# Patient Record
Sex: FEMALE
[De-identification: names, ages, dates, MRNs, and addresses within clinical notes are randomized per-mention and may not be internally consistent; named-entity substitution may affect disease eponyms.]

---

## 2019-03-19 ENCOUNTER — HOSPITAL ENCOUNTER (OUTPATIENT)
Dept: HOSPITAL 42 - PET-BROA | Age: 74
End: 2019-03-19
Payer: MEDICARE

## 2019-03-19 DIAGNOSIS — C18.9: Primary | ICD-10-CM

## 2019-03-29 ENCOUNTER — HOSPITAL ENCOUNTER (OUTPATIENT)
Dept: HOSPITAL 42 - SDS | Age: 74
LOS: 1 days | Discharge: HOME | End: 2019-03-30
Attending: RADIOLOGY
Payer: MEDICARE

## 2019-03-29 VITALS — BODY MASS INDEX: 34.4 KG/M2

## 2019-03-29 DIAGNOSIS — Z88.0: ICD-10-CM

## 2019-03-29 DIAGNOSIS — I10: ICD-10-CM

## 2019-03-29 DIAGNOSIS — Z93.3: ICD-10-CM

## 2019-03-29 DIAGNOSIS — C79.89: Primary | ICD-10-CM

## 2019-03-29 DIAGNOSIS — Z90.710: ICD-10-CM

## 2019-03-29 DIAGNOSIS — Z85.048: ICD-10-CM

## 2019-03-29 DIAGNOSIS — R09.02: ICD-10-CM

## 2019-03-29 DIAGNOSIS — F41.9: ICD-10-CM

## 2019-03-29 LAB
APTT BLD: 33.2 SECONDS (ref 26.9–38.3)
BASOPHILS # BLD AUTO: 0.01 K/MM3 (ref 0–2)
BASOPHILS NFR BLD: 0.1 % (ref 0–3)
BUN SERPL-MCNC: 17 MG/DL (ref 7–21)
CALCIUM SERPL-MCNC: 9.8 MG/DL (ref 8.4–10.5)
EOSINOPHIL # BLD: 0.2 10*3/UL (ref 0–0.7)
EOSINOPHIL NFR BLD: 3.3 % (ref 1.5–5)
ERYTHROCYTE [DISTWIDTH] IN BLOOD BY AUTOMATED COUNT: 13.8 % (ref 11.5–14.5)
GFR NON-AFRICAN AMERICAN: > 60
HGB BLD-MCNC: 13.7 G/DL (ref 12–16)
INR PPP: 1.11
LYMPHOCYTES # BLD: 1.1 10*3/UL (ref 1.2–3.4)
LYMPHOCYTES NFR BLD AUTO: 16.8 % (ref 22–35)
MCH RBC QN AUTO: 29.7 PG (ref 25–35)
MCHC RBC AUTO-ENTMCNC: 32.7 G/DL (ref 31–37)
MCV RBC AUTO: 90.7 FL (ref 80–105)
MONOCYTES # BLD AUTO: 0.5 10*3/UL (ref 0.1–0.6)
MONOCYTES NFR BLD: 6.9 % (ref 1–6)
PLATELET # BLD: 282 10^3/UL (ref 120–450)
PMV BLD AUTO: 10.1 FL (ref 7–11)
PROTHROMBIN TIME: 12.5 SECONDS (ref 9.4–12.5)
RBC # BLD AUTO: 4.62 10^6/UL (ref 3.5–6.1)
WBC # BLD AUTO: 6.7 10^3/UL (ref 4.5–11)

## 2019-03-29 NOTE — CT
PROCEDURE:  CT guided pelvic biopsy.



HISTORY:

Colon carcinoma.  2 cm mass in the pelvis near the anastomosis.  

Positive PET.  Evaluate for recurrence. 



PHYSICIAN(S):  Ap Kay MD.



TECHNIQUE:

The relative risks and indications of the procedure were explained to 

the patient and consent obtained. The patient was placed prone on the 

CT scanner and preliminary images through the pelvis obtained. 

Conscious sedation and monitoring were provided throughout the 

procedure by a nurse.



There is a triangular 2 cm opacity in the left pelvis corresponding 

to the abnormal mass seen on PET scan..  A left posterior trans 

gluteal approach was selected and the area prepped and draped in the 

usual sterile fashion. 1% Xylocaine was used to anesthetize the skin 

and soft tissues. A 17-gauge guiding needle was advanced into the 2 

cm triangular mass in the left pelvis..  Its position was confirmed 

with CT. Using coaxial technique, multiple core biopsies were 

obtained. The postprocedure images show no evidence of significant 

hemorrhage.



IMPRESSION:

1. CT-guided pelvic biopsy as described above.

## 2019-03-30 VITALS — HEART RATE: 69 BPM

## 2019-03-30 VITALS — TEMPERATURE: 98.1 F | DIASTOLIC BLOOD PRESSURE: 64 MMHG | OXYGEN SATURATION: 97 % | SYSTOLIC BLOOD PRESSURE: 132 MMHG

## 2019-03-30 VITALS — RESPIRATION RATE: 20 BRPM

## 2019-04-05 ENCOUNTER — HOSPITAL ENCOUNTER (OUTPATIENT)
Dept: HOSPITAL 42 - PAT | Age: 74
End: 2019-04-05
Payer: MEDICARE

## 2019-04-09 ENCOUNTER — HOSPITAL ENCOUNTER (INPATIENT)
Dept: HOSPITAL 42 - SDS | Age: 74
LOS: 4 days | Discharge: NURSING, REHAB OR LONG-TERM CARE | DRG: 331 | End: 2019-04-13
Payer: MEDICARE

## 2019-04-09 VITALS — BODY MASS INDEX: 34.4 KG/M2

## 2019-04-09 DIAGNOSIS — Z53.20: ICD-10-CM

## 2019-04-09 DIAGNOSIS — K43.2: ICD-10-CM

## 2019-04-09 DIAGNOSIS — C20: Primary | ICD-10-CM

## 2019-04-09 DIAGNOSIS — Z93.3: ICD-10-CM

## 2019-04-09 DIAGNOSIS — Z85.048: ICD-10-CM

## 2019-04-09 DIAGNOSIS — K66.0: ICD-10-CM

## 2019-04-09 DIAGNOSIS — Z92.3: ICD-10-CM

## 2019-04-09 DIAGNOSIS — I10: ICD-10-CM

## 2019-04-09 DIAGNOSIS — K43.5: ICD-10-CM

## 2019-04-09 DIAGNOSIS — Z88.0: ICD-10-CM

## 2019-04-09 PROCEDURE — 0DT80ZZ RESECTION OF SMALL INTESTINE, OPEN APPROACH: ICD-10-PCS

## 2019-04-09 PROCEDURE — 05HM33Z INSERTION OF INFUSION DEVICE INTO RIGHT INTERNAL JUGULAR VEIN, PERCUTANEOUS APPROACH: ICD-10-PCS

## 2019-04-09 PROCEDURE — 0WQF0ZZ REPAIR ABDOMINAL WALL, OPEN APPROACH: ICD-10-PCS

## 2019-04-09 PROCEDURE — 0JH60WZ INSERTION OF TOTALLY IMPLANTABLE VASCULAR ACCESS DEVICE INTO CHEST SUBCUTANEOUS TISSUE AND FASCIA, OPEN APPROACH: ICD-10-PCS

## 2019-04-09 NOTE — RAD
Date of service: 



04/09/2019



HISTORY:

 RIJ portacath placement, in PACU 



COMPARISON:

04/05/2019 



TECHNIQUE:

1 view obtained.



FINDINGS:



LUNGS:

Right IJ Port-A-Cath terminates at the junction of the SVC and right 

atrium.  There is no pneumothorax.  Nasogastric tube is in 

satisfactory position



PLEURA:

No significant pleural effusion identified, no pneumothorax apparent.



CARDIOVASCULAR:

No aortic atherosclerotic calcification present.



Mild cardiomegaly no pulmonary vascular congestion. 



OSSEOUS STRUCTURES:

No significant abnormalities.



VISUALIZED UPPER ABDOMEN:

Normal.



OTHER FINDINGS:

None.



IMPRESSION:

Right IJ Port-A-Cath terminates at the junction of the SVC and right 

atrium.  There is no pneumothorax.  Nasogastric tube is in 

satisfactory position

## 2019-04-09 NOTE — RAD
Date of service: 



04/09/2019



PROCEDURE:  Fluoroscopy up to 1 hr



HISTORY:

N



COMPARISON:





TECHNIQUE:

4.1 sec of fluoro time.  Cumulative dose 0.67 mGy.  2 images were 

submitted



FINDINGS:

The tip of the Port-A-Cath is seen in the right atrium.



IMPRESSION:

As above

## 2019-04-09 NOTE — PCM.SURG1
Surgeon's Initial Post Op Note





- Surgeon's Notes


Surgeon: Otoniel Patel MD


Assistant: Zuleima Fairchild, PGY-4; Ilda Bhatia, PGY-2


Type of Anesthesia: General Endo


Anesthesia Administered By: Dr. Wells


Pre-Operative Diagnosis: Pelvic mass, ventral hernias x 2, need for chemotherapy


Operative Findings: Adhesions, small bowel enterotomy, see op report


Post-Operative Diagnosis: Pelvic mass, ventral hernias x 2, need for 

chemotherapy


Operation Performed: Exploratory laparotomy, primary ventral hernia repair x 2, 

primary parastomal hernia repair, small bowel resection with side to side 

anastomosis, RIJ portacath insertion


Specimen/Specimens Removed: Small bowel, hernia sacs


Estimated Blood Loss: EBL {In ML}: 250


Blood Products Given: N/A


Drains Used: Wai


Post-Op Condition: Fair


Date of Surgery/Procedure: 04/09/19


Time of Surgery/Procedure: 14:17

## 2019-04-10 LAB
BASOPHILS # BLD AUTO: 0 K/MM3 (ref 0–2)
BASOPHILS NFR BLD: 0 % (ref 0–3)
BUN SERPL-MCNC: 12 MG/DL (ref 7–21)
CALCIUM SERPL-MCNC: 8.2 MG/DL (ref 8.4–10.5)
EOSINOPHIL # BLD: 0 10*3/UL (ref 0–0.7)
EOSINOPHIL NFR BLD: 0 % (ref 1.5–5)
ERYTHROCYTE [DISTWIDTH] IN BLOOD BY AUTOMATED COUNT: 13.4 % (ref 11.5–14.5)
GFR NON-AFRICAN AMERICAN: > 60
HGB BLD-MCNC: 11.8 G/DL (ref 12–16)
LYMPHOCYTES # BLD: 0.9 10*3/UL (ref 1.2–3.4)
LYMPHOCYTES NFR BLD AUTO: 7.2 % (ref 22–35)
MCH RBC QN AUTO: 28.5 PG (ref 25–35)
MCHC RBC AUTO-ENTMCNC: 31.8 G/DL (ref 31–37)
MCV RBC AUTO: 89.6 FL (ref 80–105)
MONOCYTES # BLD AUTO: 1 10*3/UL (ref 0.1–0.6)
MONOCYTES NFR BLD: 7.5 % (ref 1–6)
PLATELET # BLD: 300 10^3/UL (ref 120–450)
PMV BLD AUTO: 10.1 FL (ref 7–11)
RBC # BLD AUTO: 4.14 10^6/UL (ref 3.5–6.1)
WBC # BLD AUTO: 12.6 10^3/UL (ref 4.5–11)

## 2019-04-10 PROCEDURE — 0D9670Z DRAINAGE OF STOMACH WITH DRAINAGE DEVICE, VIA NATURAL OR ARTIFICIAL OPENING: ICD-10-PCS

## 2019-04-10 RX ADMIN — POTASSIUM CHLORIDE SCH MLS/HR: 2 INJECTION, SOLUTION, CONCENTRATE INTRAVENOUS at 14:34

## 2019-04-10 RX ADMIN — POTASSIUM CHLORIDE SCH MLS/HR: 2 INJECTION, SOLUTION, CONCENTRATE INTRAVENOUS at 21:43

## 2019-04-10 RX ADMIN — ENOXAPARIN SODIUM SCH MG: 40 INJECTION SUBCUTANEOUS at 14:15

## 2019-04-10 NOTE — CP.PCM.PCO
Physician Communication Note





- Physician Communication Note


Physician Communication Note: SOLITARIO Carlson:Rx-PPI/Hold Lovenox/Ice chips/Hold PO

KCl

## 2019-04-10 NOTE — CP.PCM.PCO
Additional Comments





- Additional Comments


Additional Comments: 





POD #1 operative report viewed, NGT, LON, greenberg intact, repeat labs in am, 

surgery following, PT eval pending

## 2019-04-10 NOTE — OP
PROCEDURE DATE:  04/09/2019



SURGEON:  Otoniel Patel MD



FIRST ASSISTANT:  Zuleima Fairchild DO, PGY-3



SECOND ASSISTANT:  Ilda Bhatia DO, PGY-2



ANESTHETIST:  Dr. Priscilla sewell.



ANESTHESIA:  General endotracheal - Q ball (On-Q).



PREOPERATIVE DIAGNOSES:

1.  Ventral hernia - paracolostomy hernia.

2.  Recurrent rectal carcinoma.



POSTOPERATIVE DIAGNOSES:

1.  Ventral hernia - paracolostomy hernia.

2.  Recurrent rectal carcinoma.

3.  Second incisional ventral hernia (total two) and paracolostomy hernia.

4.  Extensive adhesions.

5.  Recurrent rectal cancer.



PROCEDURES:  04/09/2019:

1.  Exploratory laparotomy with lysis of adhesions.

2.  Small bowel resection with anastomosis.

3.  Repair of pericolostomy hernia.

4.  Ventral herniorrhaphy x2.

5.  Insertion of a right internal jugular single-lumen Port-A-Cath.



OPERATIVE INDICATION:  The patient is a 73-year-old  female who is

3 years post abdominal perineal resection of the rectal carcinoma that had

been preoperatively excised and treated with radiation.  Over this period

of time, she has been followed with PET - CT scanning demonstrating

activity in the rectal space without finding any palpable lesion and a new

lesion in the lower pelvis on the left side that is biopsied by Dr. Ap Kay with CT scan confirming adenocarcinoma consistent with recurrent

rectal cancer.  There are no other findings in the PET scanning and the

patient has been advised that the oncologist would like to have the lesion

removed, so chemotherapy can be initiated.  At the time of the procedure,

the patient will have a Port-A-Cath placed to facilitate the chemotherapy

and the pericolostomy hernia and incisional hernia that have become quite

large will be repaired at the same time.  Risks, benefits and the

alternatives with their anticipated outcomes were discussed with the

patient and she signs the informed consent.



DESCRIPTION OF PROCEDURE:  The patient was brought to the operating room,

identified by her wristband, undergoes time-out procedure, is placed on the

table in a supine manner, undergoes the induction of general anesthesia and

insertion of an endotracheal tube.  Sequential compression devices were

placed on the lower extremities.  The abdomen was electrically clipped,

prepped with Hibiclens, chlorhexidine preparation of the patient was

aseptically draped.  The colostomy is closed with a running locking 2-0

polyester suture to prevent spill and the incision was closed with

iodine-impregnated VI-Drape.



Midline incision was made from the pubis up all the way towards the xiphoid

and sharp dissection carried down through to the hernia sac found below. 

Hemostasis was obtained with cautery.  The hernia sac is dissected free and

the peritoneum was entered between clamps and adhesions were encountered

and were sharply lysed throughout.  A second hernia sac is found in the

upper end of the incision and this was then exposed with extension of the

incision and excision of the hernia sac.  Both hernia sacs submitted to

pathology in formalin.



The abdomen is now completely explored and safe for adhesions.  No gross

tumor was found anywhere throughout the multiple explorations including

running of the small bowel more than 4 or 5 times.  The pelvis contained

multiple loops of small bowel in the region where the PET scanning lit up

and with meticulous dissection, this was completely removed and gross

examination of the presacral space failed to reveal any further malignancy.

The mesentery is now somewhat more hidden by the dissection and it is

elected at this point to excise this portion of the small bowel which was

stuck in the pelvis near the region of the positive PET scan and a

side-to-side anastomosis was performed with the FATOUMATA stapling devices.  The

mesentery was closed with running 2-0 chromic catgut suture.



Attention was now drawn to the paracolostomy hernia which was closed with

interrupted #1 Prolene sutures from the inside of the abdomen closing the

defect significantly and allowing one fingerbreadth alongside the

descending colon.



Attention was now drawn to the midline which was confirmed clear off any

further hernias and closed with interrupted figure-of-eight Smead-Howell

retention-type sutures with #1 Prolene sutures.  A Wai drain was placed

into the peritoneal cavity from the right side, placed down in the pelvis

and secured with 2-0 Surgidac polyester suture.  Once the subcutaneous

space was completely closed and hemostasis confirmed, the wound was lavaged

with saline and closed with 3-0 Polysorb continuous suture and the skin

with the AutoSuture skin stapler.



An On-Q ball is utilized with placement at the time of the incisional

closure in the preperitoneal space and further infiltration with Exparel on

the fascia and skin.



The right internal jugular Port-A-Cath was placed under direct fluoroscopic

vision and is secured with 3-0 Polysorb subcuticular closure and Dermabond

adhesive.  The Port-A-Cath was accessed at this point, flushed with

heparinized saline and portable x-ray will be taken postoperatively.



The patient is now awakened, extubated, transported to the recovery room in

a satisfactory condition.  Sponge, instrument and suture count were

verified as correct at the end of the procedure.  Estimated blood loss

during the procedure was 250 mL of blood.



Surgical assistants were present throughout and were extremely essential in

the dissection and in the performance of this procedure.



This dictation will be electronically signed without being read.







__________________________________________

Otoniel Patel MD





DD:  04/10/2019 8:18:07

DT:  04/10/2019 8:22:06

Job # [id number]

## 2019-04-10 NOTE — CP.PCM.PN
<Ilda Bhatia - Last Filed: 04/10/19 14:04>





Subjective





- Date & Time of Evaluation


Date of Evaluation: 04/10/19


Time of Evaluation: 09:20





- Subjective


Subjective: 





General surgery progress note for Dr. Patel-Ilda Bhatia, PGY-2





Pt seen/examined at bedside with surgical team





Pt reports pain well controlled overnight without requiring break through IV 

pain medications.   Reports abdominal pain with coughings and movement. States 

that she had some nausea and vomited last night- she attributes it to the pain 

medications.  Pt reports feeling weak due to lack of PO intake for the past 5 

days.  No output to ostomy yet. NGT with 250 dark gastric contents out/12s. 

Wai with 70cc serosanguinous output over 24hrs. UOP 150cc/12 hrs. 





Objective





- Vital Signs/Intake and Output


Vital Signs (last 24 hours): 


                                        











Temp Pulse Resp BP Pulse Ox


 


 99.9 F H  88   20   123/64   96 


 


 04/10/19 06:00  04/10/19 06:00  04/10/19 06:00  04/10/19 06:00  04/10/19 06:00








Intake and Output: 


                                        











 04/10/19 04/10/19





 06:59 18:59


 


Intake Total 1500 


 


Output Total 390 


 


Balance 1110 














- Medications


Medications: 


                               Current Medications





Enoxaparin Sodium (Lovenox)  40 mg SC DAILY [facility]; Protocol


Hydromorphone HCl (Dilaudid)  0.25 mg IVP Q3H PRN


   PRN Reason: Pain, severe (8-10)


Potassium Chloride 20 meq/ (Dextrose/Sodium Chloride)  1,010 mls @ 125 mls/hr IV

.Q8H5M [facility]


Metoprolol Tartrate (Lopressor)  2.5 mg IVP Q6 PRN


   PRN Reason: Hypertension


Ondansetron HCl (Zofran Inj)  4 mg IVP Q6 PRN


   PRN Reason: Nausea/Vomiting


   Last Admin: 04/09/19 23:25 Dose:  4 mg


Ondansetron HCl (Zofran Inj)  4 mg IVP ONCE PRN


   PRN Reason: Nausea/Vomiting











- Labs


Labs: 


                                        





                                 04/10/19 06:35 





                                 04/10/19 06:35 











- Constitutional


Appears: Non-toxic, No Acute Distress





- Head Exam


Head Exam: ATRAUMATIC, NORMAL INSPECTION, NORMOCEPHALIC





- Eye Exam


Eye Exam: EOMI, Normal appearance





- ENT Exam


ENT Exam: Mucous Membranes Moist, Normal Exam


Additional comments: 





NGT in place in nares





- Respiratory Exam


Respiratory Exam: NORMAL BREATHING PATTERN





- Cardiovascular Exam


Cardiovascular Exam: REGULAR RHYTHM, +S1, +S2





- GI/Abdominal Exam


GI & Abdominal Exam: Soft, Tenderness (over surgical incision sites).  absent: 

Distended (obese), Firm, Hernia


Additional comments: 





Midline incision with dressing in place- clean/dry/intact, Ostomy with no output


Wai in place with ~10 cc serosanguious output to ball





-  Exam


Additional comments: 





Greenberg cather in place with  150cc light yellow urine output





- Extremities Exam


Extremities Exam: Normal Inspection





- Neurological Exam


Neurological Exam: Alert, Awake, CN II-XII Intact, Oriented x3





- Psychiatric Exam


Psychiatric exam: Normal Affect, Normal Mood





- Skin


Skin Exam: Dry, Intact, Normal Color, Warm





Assessment and Plan





- Assessment and Plan (Free Text)


Assessment: 





73F POD#1 s/p laparotomy with primary ventral hernia repairs x 2, primary 

parastomal hernia repair x 1, small bowel resection x1


Plan: 





Continue NGT


Ok for ice chips


IVF


Continue Pain control- OnQ ball with local and prn breakthrough IV pain med


Anti-emetic PRN


Monitor ostomy for output


Monitor Wai output


Monitor NGT output


Maintain abdominal binder


Encourage IS use


OOBTC


PT


Maintain greenberg until urine output adequate


Strict I/Os


Initiate DVT ppx - Lovenox


SCDs





DW Dr Jorge Bhatia, PGY-





<Otoniel Patel - Last Filed: 04/11/19 10:31>





Objective





- Vital Signs/Intake and Output


Vital Signs (last 24 hours): 


                                        











Temp Pulse Resp BP Pulse Ox


 


 98.4 F   101 H  20   154/72 H  95 


 


 04/11/19 06:00  04/11/19 06:00  04/11/19 06:00  04/11/19 06:00  04/11/19 06:00








Intake and Output: 


                                        











 04/11/19 04/11/19





 06:59 18:59


 


Intake Total 2750 


 


Output Total 1095 


 


Balance 1655 














- Medications


Medications: 


                               Current Medications





Acetaminophen (Tylenol 325mg Tab)  650 mg PO Q6H PRN


   PRN Reason: Pain, Mild (1-3)


Enoxaparin Sodium (Lovenox)  40 mg SC DAILY [facility]; Protocol


   Last Admin: 04/10/19 14:15 Dose:  40 mg


Hydralazine HCl (Apresoline)  10 mg PO Q6 PRN


   PRN Reason: SBP>150 


Hydromorphone HCl (Dilaudid)  0.25 mg IVP Q3H PRN


   PRN Reason: Pain, severe (8-10)


   Last Admin: 04/11/19 01:06 Dose:  0.25 mg


Potassium Chloride 20 meq/ (Dextrose/Sodium Chloride)  1,010 mls @ 125 mls/hr IV

.Q8H5M [facility]


   Last Admin: 04/11/19 05:57 Dose:  125 mls/hr


Ondansetron HCl (Zofran Inj)  4 mg IVP Q6 PRN


   PRN Reason: Nausea/Vomiting


   Last Admin: 04/11/19 01:06 Dose:  4 mg


Pantoprazole Sodium (Protonix Inj)  40 mg IVP DAILY [facility]











- Labs


Labs: 


                                        





                                 04/11/19 07:00 





                                 04/11/19 07:00 











Assessment and Plan





- Assessment and Plan (Free Text)


Plan: 





PO1 250 cc ngt  drainage


Eddi Hold lovenox/No po KCl


\


R Jorge ASHLEY FACS

## 2019-04-11 LAB
BASOPHILS # BLD AUTO: 0.01 K/MM3 (ref 0–2)
BASOPHILS NFR BLD: 0.1 % (ref 0–3)
BUN SERPL-MCNC: 7 MG/DL (ref 7–21)
CALCIUM SERPL-MCNC: 8.3 MG/DL (ref 8.4–10.5)
EOSINOPHIL # BLD: 0 10*3/UL (ref 0–0.7)
EOSINOPHIL NFR BLD: 0.1 % (ref 1.5–5)
ERYTHROCYTE [DISTWIDTH] IN BLOOD BY AUTOMATED COUNT: 14 % (ref 11.5–14.5)
GFR NON-AFRICAN AMERICAN: > 60
HGB BLD-MCNC: 11 G/DL (ref 12–16)
LYMPHOCYTES # BLD: 0.9 10*3/UL (ref 1.2–3.4)
LYMPHOCYTES NFR BLD AUTO: 7.2 % (ref 22–35)
MCH RBC QN AUTO: 29.7 PG (ref 25–35)
MCHC RBC AUTO-ENTMCNC: 32.3 G/DL (ref 31–37)
MCV RBC AUTO: 92.2 FL (ref 80–105)
MONOCYTES # BLD AUTO: 0.9 10*3/UL (ref 0.1–0.6)
MONOCYTES NFR BLD: 7 % (ref 1–6)
PLATELET # BLD: 243 10^3/UL (ref 120–450)
PMV BLD AUTO: 10.2 FL (ref 7–11)
RBC # BLD AUTO: 3.7 10^6/UL (ref 3.5–6.1)
WBC # BLD AUTO: 12.4 10^3/UL (ref 4.5–11)

## 2019-04-11 RX ADMIN — HYDROMORPHONE HYDROCHLORIDE PRN MG: 1 INJECTION, SOLUTION INTRAMUSCULAR; INTRAVENOUS; SUBCUTANEOUS at 11:35

## 2019-04-11 RX ADMIN — POTASSIUM CHLORIDE SCH MLS/HR: 2 INJECTION, SOLUTION, CONCENTRATE INTRAVENOUS at 05:57

## 2019-04-11 RX ADMIN — POTASSIUM CHLORIDE SCH MLS/HR: 2 INJECTION, SOLUTION, CONCENTRATE INTRAVENOUS at 15:02

## 2019-04-11 RX ADMIN — HYDROMORPHONE HYDROCHLORIDE PRN MG: 1 INJECTION, SOLUTION INTRAMUSCULAR; INTRAVENOUS; SUBCUTANEOUS at 01:06

## 2019-04-11 RX ADMIN — POTASSIUM CHLORIDE SCH MLS/HR: 2 INJECTION, SOLUTION, CONCENTRATE INTRAVENOUS at 23:15

## 2019-04-11 NOTE — CP.PCM.PN
Subjective





- Date & Time of Evaluation


Date of Evaluation: 04/11/19


Time of Evaluation: 07:00





- Subjective


Subjective: 


GENERAL SURGERY PROGRESS NOTE FOR DR. NAGEL





Patient seen and examined at bedside. She states that pain is well controlled 

without narcotic pain medication. Reports pain only when coughing or moving. She

refused PT yesterday stating she was too weak. No output into colostomy yet. 





Objective





- Vital Signs/Intake and Output


Vital Signs (last 24 hours): 


                                        











Temp Pulse Resp BP Pulse Ox


 


 98.4 F   101 H  20   154/72 H  95 


 


 04/11/19 06:00  04/11/19 06:00  04/11/19 06:00  04/11/19 06:00  04/11/19 06:00








Intake and Output: 


                                        











 04/11/19 04/11/19





 06:59 18:59


 


Intake Total 2750 


 


Output Total 1095 


 


Balance 1655 














- Medications


Medications: 


                               Current Medications





Acetaminophen (Tylenol 325mg Tab)  650 mg PO Q6H PRN


   PRN Reason: Pain, Mild (1-3)


Enoxaparin Sodium (Lovenox)  40 mg SC DAILY [facility]; Protocol


   Last Admin: 04/10/19 14:15 Dose:  40 mg


Hydralazine HCl (Apresoline)  10 mg PO Q6 PRN


   PRN Reason: SBP>150 


Hydromorphone HCl (Dilaudid)  0.25 mg IVP Q3H PRN


   PRN Reason: Pain, severe (8-10)


   Last Admin: 04/11/19 01:06 Dose:  0.25 mg


Potassium Chloride 20 meq/ (Dextrose/Sodium Chloride)  1,010 mls @ 125 mls/hr IV

.Q8H5M [facility]


   Last Admin: 04/11/19 05:57 Dose:  125 mls/hr


Ondansetron HCl (Zofran Inj)  4 mg IVP Q6 PRN


   PRN Reason: Nausea/Vomiting


   Last Admin: 04/11/19 01:06 Dose:  4 mg


Pantoprazole Sodium (Protonix Inj)  40 mg IVP DAILY [facility]











- Labs


Labs: 


                                        





                                 04/11/19 07:00 





                                 04/11/19 07:00 











- Constitutional


Appears: Non-toxic, No Acute Distress





- Head Exam


Head Exam: ATRAUMATIC, NORMAL INSPECTION





- Eye Exam


Eye Exam: EOMI, Normal appearance





- Respiratory Exam


Respiratory Exam: NORMAL BREATHING PATTERN.  absent: Respiratory Distress





- Cardiovascular Exam


Cardiovascular Exam: +S1, +S2





- GI/Abdominal Exam


GI & Abdominal Exam: Soft, Tenderness (appropriate tenderness to incision area).

 absent: Distended, Firm, Guarding, Rigid


Additional comments: 


Wai drain in place with 25cc serosanguinous output overnight


NG tube with 100cc brown/coffee ground output overnight


Greenberg with 700cc output overnight


Abdominal binder in place


No output into colostomy yet


On-Q in place @ 3cc/hr





- Neurological Exam


Neurological Exam: Alert, Awake, Oriented x3





- Psychiatric Exam


Psychiatric exam: Normal Affect, Normal Mood





- Skin


Skin Exam: Dry, Normal Color





Assessment and Plan





- Assessment and Plan (Free Text)


Assessment: 


72yo F with ventral hernia, parastomal hernia, and pelvic mass (path = 

adenocarcinoma) now s/p Exploratory laparotomy, primary ventral hernia repair x 

2, primary parastomal hernia repair, small bowel resection with side to side 

anastomosis, RIJ portacath insertion POD#2





- Good urine output, DC greenberg


- Will continue to monitor for bowel function in colostomy bag


- Lovenox held due to output from NG tube


- Continue NG tube output 


- Protonix


- FU pathology


- CEA ordered


- Synthroid started


- Strongly encouraged IS use and OOB, ambulation


- Discussed plan with Dr. Jorge Fairchild PGY-4

## 2019-04-12 LAB
BASOPHILS # BLD AUTO: 0.01 K/MM3 (ref 0–2)
BASOPHILS NFR BLD: 0.1 % (ref 0–3)
BUN SERPL-MCNC: 4 MG/DL (ref 7–21)
CALCIUM SERPL-MCNC: 8.9 MG/DL (ref 8.4–10.5)
EOSINOPHIL # BLD: 0 10*3/UL (ref 0–0.7)
EOSINOPHIL NFR BLD: 0.2 % (ref 1.5–5)
ERYTHROCYTE [DISTWIDTH] IN BLOOD BY AUTOMATED COUNT: 13.8 % (ref 11.5–14.5)
GFR NON-AFRICAN AMERICAN: > 60
HGB BLD-MCNC: 11.2 G/DL (ref 12–16)
LYMPHOCYTES # BLD: 0.8 10*3/UL (ref 1.2–3.4)
LYMPHOCYTES NFR BLD AUTO: 6.4 % (ref 22–35)
MCH RBC QN AUTO: 28.9 PG (ref 25–35)
MCHC RBC AUTO-ENTMCNC: 31.5 G/DL (ref 31–37)
MCV RBC AUTO: 91.5 FL (ref 80–105)
MONOCYTES # BLD AUTO: 0.7 10*3/UL (ref 0.1–0.6)
MONOCYTES NFR BLD: 5.2 % (ref 1–6)
PLATELET # BLD: 279 10^3/UL (ref 120–450)
PMV BLD AUTO: 9.9 FL (ref 7–11)
RBC # BLD AUTO: 3.88 10^6/UL (ref 3.5–6.1)
T4 FREE SERPL-MCNC: 0.95 NG/DL (ref 0.78–2.19)
WBC # BLD AUTO: 12.6 10^3/UL (ref 4.5–11)

## 2019-04-12 RX ADMIN — ENOXAPARIN SODIUM SCH MG: 40 INJECTION SUBCUTANEOUS at 10:52

## 2019-04-12 RX ADMIN — HYDROMORPHONE HYDROCHLORIDE PRN MG: 1 INJECTION, SOLUTION INTRAMUSCULAR; INTRAVENOUS; SUBCUTANEOUS at 11:51

## 2019-04-12 RX ADMIN — HYDROMORPHONE HYDROCHLORIDE PRN MG: 1 INJECTION, SOLUTION INTRAMUSCULAR; INTRAVENOUS; SUBCUTANEOUS at 20:35

## 2019-04-12 RX ADMIN — POTASSIUM CHLORIDE SCH MLS/HR: 2 INJECTION, SOLUTION, CONCENTRATE INTRAVENOUS at 07:20

## 2019-04-12 RX ADMIN — HYDROMORPHONE HYDROCHLORIDE PRN MG: 1 INJECTION, SOLUTION INTRAMUSCULAR; INTRAVENOUS; SUBCUTANEOUS at 07:20

## 2019-04-12 NOTE — CP.PCM.PN
Subjective





- Date & Time of Evaluation


Date of Evaluation: 04/12/19


Time of Evaluation: 07:00





- Subjective


Subjective: 


GENERAL SURGERY PROGRESS NOTE FOR DR. NAGEL





Patient seen and examined at bedside. She states that PT came yesterday but that

she did not get OOB. No output into colostomy yet. Denies nausea or vomiting. 

She states that she does not want any strong pain medication because she doesn't

like how it makes her feel.





Objective





- Vital Signs/Intake and Output


Vital Signs (last 24 hours): 


                                        











Temp Pulse Resp BP Pulse Ox


 


 98 F   105 H  18   144/76   93 L


 


 04/12/19 06:00  04/12/19 06:00  04/12/19 06:00  04/12/19 06:12  04/12/19 06:00








Intake and Output: 


                                        











 04/12/19 04/12/19





 06:59 18:59


 


Intake Total 1500 


 


Output Total 260 


 


Balance 1240 














- Medications


Medications: 


                               Current Medications





Acetaminophen (Tylenol 325mg Tab)  650 mg PO Q6H PRN


   PRN Reason: Pain, Mild (1-3)


Enoxaparin Sodium (Lovenox)  40 mg SC DAILY [facility]; Protocol


   Last Admin: 04/10/19 14:15 Dose:  40 mg


Hydralazine HCl (Apresoline)  10 mg IVP Q6 PRN


   PRN Reason: Other


   Last Admin: 04/12/19 05:18 Dose:  10 mg


Hydromorphone HCl (Dilaudid)  0.25 mg IVP Q3H PRN


   PRN Reason: Pain, severe (8-10)


   Last Admin: 04/12/19 07:20 Dose:  0.25 mg


Potassium Chloride 20 meq/ (Dextrose/Sodium Chloride)  1,010 mls @ 125 mls/hr IV

.Q8H5M [facility]


   Last Admin: 04/12/19 07:20 Dose:  125 mls/hr


Ketorolac Tromethamine (Toradol)  15 mg IVP Q6 PRN


   PRN Reason: Pain, moderate (4-7)


Levothyroxine Sodium (Synthroid)  25 mcg PO THU [facility]


Levothyroxine Sodium (Synthroid)  25 mcg PO TUE [facility]


Ondansetron HCl (Zofran Inj)  4 mg IVP Q6 PRN


   PRN Reason: Nausea/Vomiting


   Last Admin: 04/12/19 07:20 Dose:  4 mg


Pantoprazole Sodium (Protonix Inj)  40 mg IVP DAILY [facility]


   Last Admin: 04/11/19 11:35 Dose:  40 mg











- Labs


Labs: 


                                        





                                 04/12/19 06:40 





                                 04/12/19 06:40 











- Constitutional


Appears: Non-toxic, No Acute Distress





- Head Exam


Head Exam: ATRAUMATIC, NORMAL INSPECTION





- Eye Exam


Eye Exam: EOMI, Normal appearance





- Respiratory Exam


Respiratory Exam: NORMAL BREATHING PATTERN.  absent: Respiratory Distress





- Cardiovascular Exam


Cardiovascular Exam: Tachycardia





- GI/Abdominal Exam


GI & Abdominal Exam: Soft, Tenderness (rodolfo-incisional tenderness).  absent: 

Distended, Firm, Guarding, Rigid, Rebound


Additional comments: 


Wai drain in place with 20cc serosanguinous output overnight


NG tube with 100cc gastric output over past 24 hours


Abdominal binder in place


No output into colostomy yet


On-Q in place @ 3cc/hr





- Neurological Exam


Neurological Exam: Alert, Awake, Oriented x3





- Psychiatric Exam


Psychiatric exam: Normal Affect, Normal Mood





- Skin


Skin Exam: Dry, Normal Color





Assessment and Plan





- Assessment and Plan (Free Text)


Assessment: 


74yo F with ventral hernia, parastomal hernia, and pelvic mass (path = 

adenocarcinoma) now s/p Exploratory laparotomy, primary ventral hernia repair x 

2, primary parastomal hernia repair, small bowel resection with side to side 

anastomosis, RIJ portacath insertion POD#3





- Will continue to monitor for bowel function in colostomy bag


- Toradol added for pain control


- Lovenox for DVT PPx


- DC NG tube


- DC Wai drain


- Protonix


- Pathology = no malignancy in specimen 


- CEA 0.5


- Strongly encouraged IS use and OOB, ambulation


- Discussed plan with Dr. Jorge Fairchild PGY-4

## 2019-04-13 ENCOUNTER — HOSPITAL ENCOUNTER (INPATIENT)
Dept: HOSPITAL 42 - TRCU | Age: 74
LOS: 7 days | Discharge: HOME | DRG: 949 | End: 2019-04-20
Payer: COMMERCIAL

## 2019-04-13 VITALS — HEART RATE: 100 BPM

## 2019-04-13 VITALS — RESPIRATION RATE: 20 BRPM | TEMPERATURE: 98.4 F | OXYGEN SATURATION: 93 %

## 2019-04-13 VITALS — SYSTOLIC BLOOD PRESSURE: 120 MMHG | DIASTOLIC BLOOD PRESSURE: 73 MMHG

## 2019-04-13 VITALS — BODY MASS INDEX: 34.7 KG/M2

## 2019-04-13 DIAGNOSIS — K43.5: ICD-10-CM

## 2019-04-13 DIAGNOSIS — Z93.3: ICD-10-CM

## 2019-04-13 DIAGNOSIS — F41.9: ICD-10-CM

## 2019-04-13 DIAGNOSIS — E03.9: ICD-10-CM

## 2019-04-13 DIAGNOSIS — D64.9: ICD-10-CM

## 2019-04-13 DIAGNOSIS — C20: ICD-10-CM

## 2019-04-13 DIAGNOSIS — R11.14: ICD-10-CM

## 2019-04-13 DIAGNOSIS — Z48.3: Primary | ICD-10-CM

## 2019-04-13 LAB
BASOPHILS # BLD AUTO: 0.01 K/MM3 (ref 0–2)
BASOPHILS NFR BLD: 0.1 % (ref 0–3)
BUN SERPL-MCNC: 6 MG/DL (ref 7–21)
CALCIUM SERPL-MCNC: 7.3 MG/DL (ref 8.4–10.5)
EOSINOPHIL # BLD: 0.2 10*3/UL (ref 0–0.7)
EOSINOPHIL NFR BLD: 1.5 % (ref 1.5–5)
ERYTHROCYTE [DISTWIDTH] IN BLOOD BY AUTOMATED COUNT: 14 % (ref 11.5–14.5)
GFR NON-AFRICAN AMERICAN: > 60
HGB BLD-MCNC: 11.1 G/DL (ref 12–16)
LYMPHOCYTES # BLD: 1 10*3/UL (ref 1.2–3.4)
LYMPHOCYTES NFR BLD AUTO: 10.4 % (ref 22–35)
MCH RBC QN AUTO: 29 PG (ref 25–35)
MCHC RBC AUTO-ENTMCNC: 31.3 G/DL (ref 31–37)
MCV RBC AUTO: 92.7 FL (ref 80–105)
MONOCYTES # BLD AUTO: 0.7 10*3/UL (ref 0.1–0.6)
MONOCYTES NFR BLD: 6.8 % (ref 1–6)
PLATELET # BLD: 313 10^3/UL (ref 120–450)
PMV BLD AUTO: 9.9 FL (ref 7–11)
RBC # BLD AUTO: 3.83 10^6/UL (ref 3.5–6.1)
WBC # BLD AUTO: 10 10^3/UL (ref 4.5–11)

## 2019-04-13 RX ADMIN — POTASSIUM CHLORIDE SCH MLS/HR: 2 INJECTION, SOLUTION, CONCENTRATE INTRAVENOUS at 02:07

## 2019-04-13 RX ADMIN — HYDROMORPHONE HYDROCHLORIDE PRN MG: 1 INJECTION, SOLUTION INTRAMUSCULAR; INTRAVENOUS; SUBCUTANEOUS at 02:50

## 2019-04-13 RX ADMIN — ENOXAPARIN SODIUM SCH MG: 40 INJECTION SUBCUTANEOUS at 10:54

## 2019-04-13 NOTE — CP.PCM.PN
Subjective





- Date & Time of Evaluation


Date of Evaluation: 04/13/19


Time of Evaluation: 07:00





- Subjective


Subjective: 


GENERAL SURGERY PROGRESS NOTE FOR DR. NAGEL





Patient seen and examined at bedside. She was OOB to chair yesterday for 2 hours

but had nausea and requested to go back into bed. The importance of being OOB 

and ambulating was again explained to the patient but she still requested to lay

back in bed. Currently, she complains of nausea and was requesting Zofran which 

was given. On-Q was increased to 6cc/hr. Dressing was changed. No output yet 

from colostomy.





Objective





- Vital Signs/Intake and Output


Vital Signs (last 24 hours): 


                                        











Temp Pulse Resp BP Pulse Ox


 


 98.4 F   100 H  20   120/73   93 L


 


 04/13/19 06:00  04/13/19 06:00  04/13/19 06:00  04/13/19 07:41  04/13/19 06:00








Intake and Output: 


                                        











 04/13/19 04/13/19





 06:59 18:59


 


Intake Total 0 


 


Balance 0 














- Medications


Medications: 


                               Current Medications





Acetaminophen (Tylenol 325mg Tab)  650 mg PO Q6H PRN


   PRN Reason: Pain, Mild (1-3)


Enoxaparin Sodium (Lovenox)  40 mg SC DAILY [facility]; Protocol


   Last Admin: 04/12/19 10:52 Dose:  40 mg


Hydralazine HCl (Apresoline)  10 mg IVP Q6 PRN


   PRN Reason: Other


   Last Admin: 04/13/19 05:50 Dose:  10 mg


Hydromorphone HCl (Dilaudid)  0.25 mg IVP Q3H PRN


   PRN Reason: Pain, severe (8-10)


   Last Admin: 04/13/19 02:50 Dose:  0.25 mg


Potassium Chloride 20 meq/ (Dextrose/Sodium Chloride)  1,010 mls @ 125 mls/hr IV

.Q8H5M BETTE


   Last Admin: 04/13/19 02:07 Dose:  125 mls/hr


Ketorolac Tromethamine (Toradol)  15 mg IVP Q6 PRN


   PRN Reason: Pain, moderate (4-7)


Levothyroxine Sodium (Synthroid)  25 mcg PO THU BETTE


Levothyroxine Sodium (Synthroid)  25 mcg PO TUE BETTE


Ondansetron HCl (Zofran Inj)  4 mg IVP Q6 PRN


   PRN Reason: Nausea/Vomiting


   Last Admin: 04/13/19 07:38 Dose:  4 mg


Pantoprazole Sodium (Protonix Inj)  40 mg IVP DAILY BETTE


   Last Admin: 04/12/19 10:52 Dose:  40 mg











- Labs


Labs: 


                                        





                                 04/13/19 06:30 





                                 04/13/19 06:30 











- Constitutional


Appears: Non-toxic, No Acute Distress





- Head Exam


Head Exam: ATRAUMATIC, NORMAL INSPECTION





- Respiratory Exam


Respiratory Exam: NORMAL BREATHING PATTERN.  absent: Respiratory Distress





- Cardiovascular Exam


Cardiovascular Exam: Tachycardia





- GI/Abdominal Exam


GI & Abdominal Exam: Soft, Tenderness (mild rodolfo-incisional tenderness).  

absent: Distended, Firm, Guarding, Rigid, Rebound


Additional comments: 


Abdominal binder in place


No output into colostomy yet


On-Q in place @ 6cc/hr


Dressing removed and replaced. Staples intact





- Neurological Exam


Neurological Exam: Alert, Awake, Oriented x3





- Psychiatric Exam


Psychiatric exam: Normal Affect, Normal Mood





- Skin


Skin Exam: Normal Color, Warm





Assessment and Plan





- Assessment and Plan (Free Text)


Assessment: 


72yo F with ventral hernia, parastomal hernia, and pelvic mass (path = 

adenocarcinoma) now s/p Exploratory laparotomy, primary ventral hernia repair x 

2, primary parastomal hernia repair, small bowel resection with side to side 

anastomosis, RIJ portacath insertion POD#4





- Increased ON-Q to 6cc/hr


- Will continue to monitor for bowel function in colostomy bag


- Lovenox for DVT PPx


- Zofran PRN nausea


- Pathology = no malignancy in specimen 


- CEA 0.5


- Strongly encouraged IS use and OOB, ambulation


- Discussed plan with Dr. Jorge Fairchild PGY-4

## 2019-04-14 LAB
BASOPHILS # BLD AUTO: 0.01 K/MM3 (ref 0–2)
BASOPHILS NFR BLD: 0.1 % (ref 0–3)
EOSINOPHIL # BLD: 0 10*3/UL (ref 0–0.7)
EOSINOPHIL NFR BLD: 0.4 % (ref 1.5–5)
ERYTHROCYTE [DISTWIDTH] IN BLOOD BY AUTOMATED COUNT: 13.9 % (ref 11.5–14.5)
HGB BLD-MCNC: 12 G/DL (ref 12–16)
LYMPHOCYTES # BLD: 1.1 10*3/UL (ref 1.2–3.4)
LYMPHOCYTES NFR BLD AUTO: 10.1 % (ref 22–35)
MCH RBC QN AUTO: 29.1 PG (ref 25–35)
MCHC RBC AUTO-ENTMCNC: 31.7 G/DL (ref 31–37)
MCV RBC AUTO: 91.5 FL (ref 80–105)
MONOCYTES # BLD AUTO: 0.7 10*3/UL (ref 0.1–0.6)
MONOCYTES NFR BLD: 6.4 % (ref 1–6)
PLATELET # BLD: 424 10^3/UL (ref 120–450)
PMV BLD AUTO: 9.7 FL (ref 7–11)
RBC # BLD AUTO: 4.13 10^6/UL (ref 3.5–6.1)
WBC # BLD AUTO: 10.6 10^3/UL (ref 4.5–11)

## 2019-04-14 RX ADMIN — ENOXAPARIN SODIUM SCH MG: 40 INJECTION SUBCUTANEOUS at 06:17

## 2019-04-14 NOTE — CP.PCM.PN
Subjective





- Date & Time of Evaluation


Date of Evaluation: 04/14/19


Time of Evaluation: 07:00





- Subjective


Subjective: 


GENERAL SURGERY PROGRESS NOTE FOR DR. NAGEL





Patient seen and examined at bedside. She was transferred to TCU yesterday. 

Overnight, pt vomited small amount. She was given zofran and her nausea 

resolved.   On-Q running at 3cc/hr as patient did not want a higher rate. 

Dressing was changed. No output yet from colostomy. 





Objective





- Vital Signs/Intake and Output


Vital Signs (last 24 hours): 


                                        











Temp Pulse Resp BP Pulse Ox


 


 97.8 F   124 H  18   172/101 H   


 


 04/13/19 15:11  04/14/19 05:26  04/13/19 15:11  04/14/19 05:26   








Intake and Output: 


                                        











 04/14/19 04/14/19





 06:59 18:59


 


Intake Total 320 


 


Balance 320 














- Medications


Medications: 


                               Current Medications





Acetaminophen (Tylenol 325mg Tab)  650 mg PO Q6H PRN; Protocol


   PRN Reason: Pain, Mild (1-3)


Alprazolam (Xanax)  0.25 mg PO PRN PRN; Protocol


   PRN Reason: Anxiety


   Stop: 04/21/19 08:47


Atenolol (Tenormin)  25 mg PO BID BETTE


Enoxaparin Sodium (Lovenox)  40 mg SC 0600 [facility]; Protocol


   Last Admin: 04/14/19 06:17 Dose:  40 mg


Hydralazine HCl (Apresoline)  10 mg IVP Q6 PRN; Protocol


   PRN Reason: Systolic Blood Pressure


   Last Admin: 04/14/19 05:26 Dose:  10 mg


Hydromorphone HCl (Dilaudid)  0.25 mg IVP Q3H PRN; Protocol


   PRN Reason: Pain, severe (8-10)


Potassium Chloride 20 meq/ (Sodium Chloride)  1,010 mls @ 125 mls/hr IV .Q8H5M 

BETTE


   Last Admin: 04/14/19 06:14 Dose:  125 mls/hr


Ketorolac Tromethamine (Toradol)  15 mg IVP Q6H PRN; Protocol


   PRN Reason: Pain, moderate (4-7)


   Last Admin: 04/14/19 06:19 Dose:  15 mg


Levothyroxine Sodium (Synthroid)  25 mcg PO TUE [facility]; Protocol


Levothyroxine Sodium (Synthroid)  25 mcg PO THU [facility]; Protocol


Metoclopramide HCl (Reglan)  5 mg PO 0600,1130,1630,2200 BETTE


Ondansetron HCl (Zofran Inj)  4 mg IVP Q6H PRN; Protocol


   PRN Reason: Nausea/Vomiting


   Last Admin: 04/14/19 06:15 Dose:  4 mg


Pantoprazole Sodium (Protonix Inj)  40 mg IVP 0600 [facility]; Protocol


   Last Admin: 04/14/19 06:15 Dose:  40 mg


Scopolamine (Transderm-Scop)  1 patch TD Q3D [facility]; Protocol


   Last Admin: 04/13/19 17:21 Dose:  Not Given











- Constitutional


Appears: Non-toxic, No Acute Distress





- Head Exam


Head Exam: ATRAUMATIC, NORMAL INSPECTION





- Eye Exam


Eye Exam: EOMI, Normal appearance





- Respiratory Exam


Respiratory Exam: NORMAL BREATHING PATTERN.  absent: Respiratory Distress





- Cardiovascular Exam


Cardiovascular Exam: +S1, +S2





- GI/Abdominal Exam


GI & Abdominal Exam: Soft.  absent: Distended, Firm, Guarding, Rigid, 

Tenderness, Rebound


Additional comments: 


Abdominal binder in place


No output into colostomy yet


On-Q in place @ 3cc/hr


Dressing removed and replaced. Staples intact





- Neurological Exam


Neurological Exam: Alert, Awake, Oriented x3





- Psychiatric Exam


Psychiatric exam: Normal Affect, Normal Mood





- Skin


Skin Exam: Dry, Normal Color





Assessment and Plan





- Assessment and Plan (Free Text)


Assessment: 


74yo F with ventral hernia, parastomal hernia, and pelvic mass (path = 

adenocarcinoma) now s/p Exploratory laparotomy, primary ventral hernia repair x 

2, primary parastomal hernia repair, small bowel resection with side to side 

anastomosis, RIJ portacath insertion POD#5





- NPO w/ PO meds & ice chips


- ON-Q in place at 3cc/hr, Continue toradol, DC Dilaudid, Add percocet for 

breakthrough pain


- Will continue to monitor for bowel function in colostomy bag


- Lovenox for DVT PPx


- Zofran PRN nausea


- Reglan PO added


- Home PO meds added: atenolol, PRN xanax


- Strongly encouraged IS use and OOB, ambulation


- Discussed plan with Dr. Jorge Fairchild PGY-4

## 2019-04-15 LAB
BUN SERPL-MCNC: 19 MG/DL (ref 7–21)
CALCIUM SERPL-MCNC: 8.6 MG/DL (ref 8.4–10.5)
GFR NON-AFRICAN AMERICAN: > 60

## 2019-04-15 PROCEDURE — F07Z5FZ BED MOBILITY TREATMENT USING ASSISTIVE, ADAPTIVE, SUPPORTIVE OR PROTECTIVE EQUIPMENT: ICD-10-PCS

## 2019-04-15 PROCEDURE — F07H6YZ THERAPEUTIC EXERCISE TREATMENT OF INTEGUMENTARY SYSTEM - WHOLE BODY USING OTHER EQUIPMENT: ICD-10-PCS

## 2019-04-15 PROCEDURE — F07Z8ZZ TRANSFER TRAINING TREATMENT: ICD-10-PCS

## 2019-04-15 PROCEDURE — F08Z1FZ DRESSING TECHNIQUES TREATMENT USING ASSISTIVE, ADAPTIVE, SUPPORTIVE OR PROTECTIVE EQUIPMENT: ICD-10-PCS

## 2019-04-15 PROCEDURE — F07Z9FZ GAIT TRAINING/FUNCTIONAL AMBULATION TREATMENT USING ASSISTIVE, ADAPTIVE, SUPPORTIVE OR PROTECTIVE EQUIPMENT: ICD-10-PCS

## 2019-04-15 RX ADMIN — ENOXAPARIN SODIUM SCH: 40 INJECTION SUBCUTANEOUS at 06:57

## 2019-04-15 NOTE — RAD
Date of service: 



04/13/2019



HISTORY:

 s/p abdominal surgery 



COMPARISON:

CT 11/01/2016



TECHNIQUE:

1 view obtained.



FINDINGS:



BOWEL:

Mildly dilated small bowel loops are seen.  This could be secondary 

to ileus.  Early obstruction cannot be excluded.  Surgical clips are 

seen to the left of midline 



BONES:

Normal.



OTHER FINDINGS:

None.



IMPRESSION:

Mildly dilated small bowel loops are seen.  This could be secondary 

to ileus.  Early obstruction cannot be excluded.  Surgical clips are 

seen to the left of midline

## 2019-04-15 NOTE — CP.PCM.PN
Subjective





- Date & Time of Evaluation


Date of Evaluation: 04/15/19


Time of Evaluation: 08:40





- Subjective


Subjective: 





GENERAL SURGERY PROGRESS NOTE FOR DR. NAGEL





Patient seen and examined at bedside. Pt is resting comfortably. Pt with 

multiple episodes of bilious vomiting since early this morning, about 4-5 

episodes as per pt, nonbloody. Denies fever, chills, chest pain, sob, abdominal 

pain, diarrhea, hematochezia, melena. On-Q replaced and resumed at 5 cc/hr. Pt 

reports passing garcia through the ostomy bag. Pt with episode of bilious vomiting

prior to examination, and given Zofran and Toradol. Given Reglan 5 mg IVP as 

well.





Objective





- Vital Signs/Intake and Output


Vital Signs (last 24 hours): 


                                        











Temp Pulse Resp BP Pulse Ox


 


 97.6 F   86   20   159/85 H  98 


 


 04/15/19 06:00  04/15/19 06:00  04/15/19 06:00  04/15/19 06:00  04/15/19 06:00








Intake and Output: 


                                        











 04/15/19 04/15/19





 06:59 18:59


 


Intake Total 0 


 


Balance 0 














- Medications


Medications: 


                               Current Medications





Acetaminophen (Tylenol 325mg Tab)  650 mg PO Q6H PRN; Protocol


   PRN Reason: Pain, Mild (1-3)


Alprazolam (Xanax)  0.25 mg PO Q12H PRN; Protocol


   PRN Reason: Anxiety


   Stop: 04/21/19 08:47


Atenolol (Tenormin)  25 mg PO BID [facility]


   Last Admin: 04/14/19 17:26 Dose:  25 mg


Enoxaparin Sodium (Lovenox)  40 mg SC 0600 [facility]; Protocol


   Last Admin: 04/15/19 06:57 Dose:  Not Given


Hydralazine HCl (Apresoline)  10 mg IVP Q6 PRN; Protocol


   PRN Reason: Systolic Blood Pressure


   Last Admin: 04/14/19 05:26 Dose:  10 mg


Potassium Chloride 20 meq/ (Sodium Chloride)  1,010 mls @ 125 mls/hr IV .Q8H5M 

BETTE


   Last Admin: 04/15/19 05:50 Dose:  125 mls/hr


Ketorolac Tromethamine (Toradol)  15 mg IVP Q6H PRN; Protocol


   PRN Reason: Pain, moderate (4-7)


   Last Admin: 04/15/19 05:44 Dose:  15 mg


Levothyroxine Sodium (Synthroid)  25 mcg PO TUE [facility]; Protocol


Levothyroxine Sodium (Synthroid)  25 mcg PO THU [facility] includes the [facility]; Protocol


Metoclopramide HCl (Reglan)  5 mg PO 0600,1130,1630,2200 BETTE


   Last Admin: 04/15/19 06:04 Dose:  Not Given


Ondansetron HCl (Zofran Inj)  4 mg IVP Q6H PRN; Protocol


   PRN Reason: Nausea/Vomiting


   Last Admin: 04/15/19 05:43 Dose:  4 mg


Oxycodone/Acetaminophen (Percocet 5/325 Mg Tab)  1 tab PO Q4H PRN


   PRN Reason: Pain, severe (8-10)


   Stop: 04/17/19 08:59


Pantoprazole Sodium (Protonix Inj)  40 mg IVP 0600 [facility] includes the [facility]; Protocol


   Last Admin: 04/15/19 06:57 Dose:  Not Given


Scopolamine (Transderm-Scop)  1 patch TD Q3D [facility]; Protocol


   Last Admin: 04/13/19 17:21 Dose:  Not Given











- Labs


Labs: 


                                        





                                 04/14/19 10:00 





                                 04/15/19 07:00 











- Constitutional


Appears: Non-toxic, No Acute Distress





- Additional Findings


Additional findings: 





- Constitutional


Appears: Non-toxic, No Acute Distress





- Head Exam


Head Exam: ATRAUMATIC, NORMAL INSPECTION





- Eye Exam


Eye Exam: EOMI, Normal appearance





- Respiratory Exam


Respiratory Exam: NORMAL BREATHING PATTERN.  absent: Respiratory Distress





- Cardiovascular Exam


Cardiovascular Exam: +S1, +S2





- GI/Abdominal Exam


GI & Abdominal Exam: Soft.  absent: Distended, Firm, Guarding, Rigid, 

Tenderness, Rebound


Additional comments: 


Abdominal binder in place


No fecal output into colostomy yet


On-Q in place @ 5cc/hr


Staples intact





- Neurological Exam


Neurological Exam: Alert, Awake, Oriented x3





- Psychiatric Exam


Psychiatric exam: Normal Affect, Normal Mood





- Skin


Skin Exam: Dry, Normal Color





Assessment and Plan





- Assessment and Plan (Free Text)


Assessment: 





72yo F with ventral hernia, parastomal hernia, and pelvic mass (path = 

adenocarcinoma) now s/p Exploratory laparotomy, primary ventral hernia repair x 

2, primary parastomal hernia repair, small bowel resection with side to side an

astomosis, RIJ portacath insertion POD#6.





Plan:





Diet advanced to CLD as pt passing gas through ostomy


ON-Q replaced, at 5cc/hr, Continue toradol, percocet for breakthrough pain


Will continue to monitor for bowel function in colostomy bag


Lovenox for VTE PPX


Continue Zofran IVP PRN nausea, Reglan PO BETTE


Continue home PO meds: atenolol, PRN xanax


Strongly encouraged IS use and OOB to chair


Dulcolax to move bowels





Discussed plan with Dr. Jorge Eddy PGY1


Pager#: [phone]

## 2019-04-16 VITALS — RESPIRATION RATE: 18 BRPM

## 2019-04-16 LAB
BASOPHILS # BLD AUTO: 0.02 K/MM3 (ref 0–2)
BASOPHILS NFR BLD: 0.3 % (ref 0–3)
BUN SERPL-MCNC: 12 MG/DL (ref 7–21)
CALCIUM SERPL-MCNC: 8.5 MG/DL (ref 8.4–10.5)
EOSINOPHIL # BLD: 0.4 10*3/UL (ref 0–0.7)
EOSINOPHIL NFR BLD: 4.4 % (ref 1.5–5)
ERYTHROCYTE [DISTWIDTH] IN BLOOD BY AUTOMATED COUNT: 14.1 % (ref 11.5–14.5)
GFR NON-AFRICAN AMERICAN: > 60
HGB BLD-MCNC: 10 G/DL (ref 12–16)
LYMPHOCYTES # BLD: 0.7 10*3/UL (ref 1.2–3.4)
LYMPHOCYTES NFR BLD AUTO: 9.1 % (ref 22–35)
MCH RBC QN AUTO: 28.4 PG (ref 25–35)
MCHC RBC AUTO-ENTMCNC: 30.6 G/DL (ref 31–37)
MCV RBC AUTO: 92.9 FL (ref 80–105)
MONOCYTES # BLD AUTO: 0.5 10*3/UL (ref 0.1–0.6)
MONOCYTES NFR BLD: 6.8 % (ref 1–6)
PLATELET # BLD: 307 10^3/UL (ref 120–450)
PMV BLD AUTO: 9.1 FL (ref 7–11)
RBC # BLD AUTO: 3.52 10^6/UL (ref 3.5–6.1)
WBC # BLD AUTO: 7.9 10^3/UL (ref 4.5–11)

## 2019-04-16 RX ADMIN — ENOXAPARIN SODIUM SCH MG: 40 INJECTION SUBCUTANEOUS at 05:34

## 2019-04-16 NOTE — CP.PCM.PN
Subjective





- Date & Time of Evaluation


Date of Evaluation: 04/16/19


Time of Evaluation: 07:00





- Subjective


Subjective: 


GENERAL SURGERY PROGRESS NOTE FOR DR. NAGEL





Patient seen and examined at bedside at TCU. She is doing better, nausea now 

resolved. She is ambulating. She is tolerating liquid diet. She is using her IS 

and denies abdominal pain. 





Objective





- Vital Signs/Intake and Output


Vital Signs (last 24 hours): 


                                        











Temp Pulse Resp BP Pulse Ox


 


 98.3 F   78   18   153/66 H  94 L


 


 04/16/19 16:00  04/16/19 16:00  04/16/19 16:00  04/16/19 16:00  04/16/19 16:00








Intake and Output: 


                                        











 04/16/19 04/16/19





 06:59 18:59


 


Intake Total 240 


 


Balance 240 














- Medications


Medications: 


                               Current Medications





Acetaminophen (Tylenol 325mg Tab)  650 mg PO Q6H PRN; Protocol


   PRN Reason: Pain, Mild (1-3)


Alprazolam (Xanax)  0.25 mg PO Q12H PRN; Protocol


   PRN Reason: Anxiety


   Stop: 04/21/19 08:47


Atenolol (Tenormin)  25 mg PO BID [facility]


   Last Admin: 04/16/19 10:05 Dose:  25 mg


Enoxaparin Sodium (Lovenox)  40 mg SC 0600 [facility]; Protocol


   Last Admin: 04/16/19 05:34 Dose:  40 mg


Hydralazine HCl (Apresoline)  10 mg IVP Q6 PRN; Protocol


   PRN Reason: Systolic Blood Pressure


   Last Admin: 04/14/19 05:26 Dose:  10 mg


Potassium Chloride 20 meq/ (Sodium Chloride)  1,010 mls @ 50 mls/hr IV .[id number] 

BETTE


   Last Admin: 04/16/19 15:45 Dose:  50 mls/hr


Ketorolac Tromethamine (Toradol)  15 mg IVP Q6H PRN; Protocol


   PRN Reason: Pain, moderate (4-7)


   Last Admin: 04/15/19 05:44 Dose:  15 mg


Levothyroxine Sodium (Synthroid)  25 mcg PO THU [facility]; Protocol


Levothyroxine Sodium (Synthroid)  25 mcg PO TUE [facility]; Protocol


Metoclopramide HCl (Reglan)  5 mg PO 0600,1130,1630,2200 [facility]


   Last Admin: 04/16/19 15:45 Dose:  5 mg


Ondansetron HCl (Zofran Inj)  4 mg IVP Q6H PRN; Protocol


   PRN Reason: Nausea/Vomiting


   Last Admin: 04/15/19 05:43 Dose:  4 mg


Oxycodone/Acetaminophen (Percocet 5/325 Mg Tab)  1 tab PO Q4H PRN


   PRN Reason: Pain, severe (8-10)


   Stop: 04/17/19 08:59


Pantoprazole Sodium (Protonix Ec Tab)  40 mg PO 0600 [facility]; Protocol


Scopolamine (Transderm-Scop)  1 patch TD Q3D [facility]; Protocol


   Last Admin: 04/16/19 15:41 Dose:  Not Given











- Labs


Labs: 


                                        





                                 04/16/19 07:00 





                                 04/16/19 07:00 











- Constitutional


Appears: Non-toxic, No Acute Distress





- Head Exam


Head Exam: ATRAUMATIC, NORMAL INSPECTION





- Eye Exam


Eye Exam: EOMI, Normal appearance





- Respiratory Exam


Respiratory Exam: NORMAL BREATHING PATTERN.  absent: Respiratory Distress





- Cardiovascular Exam


Cardiovascular Exam: +S1, +S2





- GI/Abdominal Exam


GI & Abdominal Exam: Soft.  absent: Distended, Firm, Guarding, Rigid, 

Tenderness, Rebound


Additional comments: 


Abdominal binder in place


On-Q in place at 5cc/hr


Staples in place


Dressing changed





- Neurological Exam


Neurological Exam: Alert, Awake, Oriented x3





- Psychiatric Exam


Psychiatric exam: Normal Affect, Normal Mood





- Skin


Skin Exam: Dry, Normal Color, Warm





Assessment and Plan





- Assessment and Plan (Free Text)


Assessment: 


72yo F with ventral hernia, parastomal hernia, and pelvic mass (path = 

adenocarcinoma) now s/p Exploratory laparotomy, primary ventral hernia repair x 

2, primary parastomal hernia repair, small bowel resection with side to side 

anastomosis, RIJ portacath insertion POD#7





- Tolerating liquid diet, advanced to regular diet


- ON-Q in place at 5cc/hr, pain well controlled with On-Q and toradol


- Having liquid stool output into colostomy bag


- Lovenox for DVT PPx


- Strongly encouraged IS use and OOB, ambulation


- Discussed plan with Dr. Jorge Fairchild PGY-4

## 2019-04-17 VITALS — OXYGEN SATURATION: 95 % | TEMPERATURE: 97.9 F

## 2019-04-17 PROCEDURE — F08Z2FZ GROOMING/PERSONAL HYGIENE TREATMENT USING ASSISTIVE, ADAPTIVE, SUPPORTIVE OR PROTECTIVE EQUIPMENT: ICD-10-PCS

## 2019-04-17 RX ADMIN — ENOXAPARIN SODIUM SCH MG: 40 INJECTION SUBCUTANEOUS at 05:41

## 2019-04-17 NOTE — CON
DATE:  04/16/2019



REASON FOR CONSULTATION:  Rectal cancer.



HISTORY OF PRESENT ILLNESS:  Ms. Tiny Hurley is a 73-year-old female well

known to me from office.  She was recently admitted to the hospital for

resection of the metastatic lesion in the pelvic wall.  She underwent

surgery by Dr. Patel, she also had hernia repair.   prior to surgery,

biopsy was positive for metastatic rectal cancer on the right side of

pelvic wall.  She is recuperating well from surgery, able to tolerate p.o. 

She is ambulating in the room.  Denies any pain.



PAST MEDICAL HISTORY:  Hypothyroidism and anxiety.



PAST SURGICAL HISTORY:  APR resection.  History of radiation.



REVIEW OF SYSTEMS:  As per HPI.  Rest of 12-point review of systems

reviewed negative.



PHYSICAL EXAMINATION:

GENERAL:  Comfortable, not in acute distress.

VITAL SIGNS:  Temperature 98.7, heart rate 82 per minute, blood pressure

130/80 and oxygen saturation 98% on room air.

HEENT:  Pallor positive.

NECK:  No lymphadenopathy.

CHEST:  Air entry present and equal bilateral.  No added sounds.

CARDIOVASCULAR:  S1 and S2 normal.  No murmur.  No gallop.

ABDOMEN:  Soft, and nontender.  No hepatosplenomegaly.

EXTREMITIES:  No edema.



LABORATORY DATA:  White count 7.9, hemoglobin 10, hematocrit 32.7 and

platelet 307.  Sodium 137, potassium 3.7, BUN 12, and creatinine 0.6.  TSH

2.9.



MEDICATIONS:  Reviewed.  Tylenol 650 mg every 6 hours p.r.n., Xanax 0.25 mg

every 12 hours, , Lovenox 40 mg daily, Toradol 15 mg IV

every 6 hours p.r.n., levothyroxine 25 mcg once a week and Zofran p.r.n.



ASSESSMENT AND PLAN:

1.  Stage IV rectal cancer, recurrence in the rectal wall status post

resection, status post hernia repair.  She will need chemotherapy upon

recovery from the surgery - 4 to 6 weeks after surgery.  She had

chemoradiation in the past and only oral capecitabine chemotherapy. She has 
refused infusional

chemotherapy before.  She would be a  candidate for FOLFOX/Avastin. 

Molecular studies are sent already on the biopsy specimen.

2.  Hypothyroidism.  She sees endocrine, Dr. Rodrigez.  INTOLERANCE TO SYNTHROID 
TAKES ONLY

ONCE A WEEK.

3.  Anemia:  mild,  hemoglobin 10.  We will do iron studies and if needed give

IV iron.  CEA  has been normal 0.5.  Thyroid function are within normal

limits.  to  continue deep venous thrombosis prophylaxis lovenox 40 mg 
subcutaneously

daily.



Thank you, Dr. Patel for allowing us to participate in Ms. Hurley's care.







__________________________________________

Gaye Guerra MD





DD:  04/17/2019 15:27:01

DT:  04/17/2019 15:49:30

Job # [id number]

[id number]

## 2019-04-17 NOTE — CP.PCM.PN
Subjective





- Date & Time of Evaluation


Date of Evaluation: 04/17/19


Time of Evaluation: 07:00





- Subjective


Subjective: 


GENERAL SURGERY PROGRESS NOTE FOR DR. NAGEL





Patient seen and examined at bedside at TCU. She reports ambulating with PT. She

is tolerating regular diet and is having bowel function into the colostomy. She 

denies abdominal pain. 





Objective





- Vital Signs/Intake and Output


Vital Signs (last 24 hours): 


                                        











Temp Pulse Resp BP Pulse Ox


 


 98.3 F   86   18   158/89 H  94 L


 


 04/16/19 16:00  04/17/19 12:48  04/16/19 16:00  04/17/19 12:48  04/16/19 16:00








Intake and Output: 


                                        











 04/17/19 04/17/19





 06:59 18:59


 


Intake Total 340 


 


Balance 340 














- Medications


Medications: 


                               Current Medications





Acetaminophen (Tylenol 325mg Tab)  650 mg PO Q6H PRN; Protocol


   PRN Reason: Pain, Mild (1-3)


Alprazolam (Xanax)  0.25 mg PO Q12H PRN; Protocol


   PRN Reason: Anxiety


   Stop: 04/21/19 08:47


Atenolol (Tenormin)  25 mg PO BID [facility]


   Last Admin: 04/17/19 10:00 Dose:  25 mg


Enoxaparin Sodium (Lovenox)  40 mg SC 0600 [facility]; Protocol


   Last Admin: 04/17/19 05:41 Dose:  40 mg


Hydralazine HCl (Apresoline)  10 mg IVP Q6 PRN; Protocol


   PRN Reason: Systolic Blood Pressure


   Last Admin: 04/17/19 05:40 Dose:  10 mg


Ketorolac Tromethamine (Toradol)  15 mg IVP Q6H PRN; Protocol


   PRN Reason: Pain, moderate (4-7)


   Last Admin: 04/15/19 05:44 Dose:  15 mg


Levothyroxine Sodium (Synthroid)  25 mcg PO THU [facility]; Protocol


Levothyroxine Sodium (Synthroid)  25 mcg PO TUE [facility]; Protocol


Losartan Potassium (Cozaar)  25 mg PO DAILY [facility]


   Last Admin: 04/17/19 12:48 Dose:  25 mg


Metoclopramide HCl (Reglan)  5 mg PO 0600,1130,1630,2200 [facility]


   Last Admin: 04/17/19 12:26 Dose:  5 mg


Ondansetron HCl (Zofran Inj)  4 mg IVP Q6H PRN; Protocol


   PRN Reason: Nausea/Vomiting


   Last Admin: 04/15/19 05:43 Dose:  4 mg


Pantoprazole Sodium (Protonix Ec Tab)  40 mg PO 0600 [facility]; Protocol


Scopolamine (Transderm-Scop)  1 patch TD Q3D [facility]; Protocol


   Last Admin: 04/16/19 15:41 Dose:  Not Given











- Labs


Labs: 


                                        





                                 04/16/19 07:00 





                                 04/16/19 07:00 











- Constitutional


Appears: Non-toxic, No Acute Distress





- Head Exam


Head Exam: ATRAUMATIC, NORMAL INSPECTION





- Eye Exam


Eye Exam: EOMI, Normal appearance





- Respiratory Exam


Respiratory Exam: NORMAL BREATHING PATTERN.  absent: Respiratory Distress





- Cardiovascular Exam


Cardiovascular Exam: +S1, +S2





- GI/Abdominal Exam


GI & Abdominal Exam: Soft.  absent: Distended, Firm, Guarding, Rigid, 

Tenderness, Rebound


Additional comments: 


Staples in place


Small amount of serous drainage at inferior incision site, dressing changed


On-Q in place





- Neurological Exam


Neurological Exam: Alert, Awake, Oriented x3





- Psychiatric Exam


Psychiatric exam: Normal Affect, Normal Mood





- Skin


Skin Exam: Dry, Normal Color, Warm





Assessment and Plan





- Assessment and Plan (Free Text)


Assessment: 


74yo F with ventral hernia, parastomal hernia, and pelvic mass (path = 

adenocarcinoma) now s/p Exploratory laparotomy, primary ventral hernia repair x 

2, primary parastomal hernia repair, small bowel resection with side to side 

anastomosis, RIJ portacath insertion POD#8





- Tolerating regular diet


- ON-Q in place at 5cc/hr, patient states that she doesn't have any pain 


- Having liquid stool output into colostomy bag


- Added Losartan for additional BP control 


- Lovenox for DVT PPx


- Strongly encouraged IS use and OOB, ambulation


- Discussed plan with Dr. Jorge Fairchild PGY-4

## 2019-04-18 RX ADMIN — ENOXAPARIN SODIUM SCH MG: 40 INJECTION SUBCUTANEOUS at 05:29

## 2019-04-18 RX ADMIN — PANTOPRAZOLE SODIUM SCH MG: 40 TABLET, DELAYED RELEASE ORAL at 05:29

## 2019-04-18 NOTE — CP.PCM.PN
Subjective





- Date & Time of Evaluation


Date of Evaluation: 04/18/19


Time of Evaluation: 07:00





- Subjective


Subjective: 


GENERAL SURGERY PROGRESS NOTE FOR DR. NAGEL





Patient seen and examined at bedside at TCU. She is tolerating regular diet, 

denies nausea or vomiting. She is having bowel function into the colostomy. She 

denies abdominal pain. On-Q was removed yesterday. She denies fever/chills. She 

is using IS. 





Objective





- Vital Signs/Intake and Output


Vital Signs (last 24 hours): 


                                        











Temp Pulse Resp BP Pulse Ox


 


 97.9 F   85   18   131/83   95 


 


 04/17/19 10:00  04/18/19 11:00  04/17/19 10:00  04/18/19 11:00  04/17/19 16:47











- Medications


Medications: 


                               Current Medications





Acetaminophen (Tylenol 325mg Tab)  650 mg PO Q6H PRN; Protocol


   PRN Reason: Pain, Mild (1-3)


Alprazolam (Xanax)  0.25 mg PO Q12H PRN; Protocol


   PRN Reason: Anxiety


   Stop: 04/21/19 08:47


Atenolol (Tenormin)  25 mg PO BID [facility]


   Last Admin: 04/18/19 11:00 Dose:  25 mg


Enoxaparin Sodium (Lovenox)  40 mg SC 0600 [facility]; Protocol


   Last Admin: 04/18/19 05:29 Dose:  40 mg


Hydralazine HCl (Apresoline)  10 mg IVP Q6 PRN; Protocol


   PRN Reason: Systolic Blood Pressure


   Last Admin: 04/17/19 05:40 Dose:  10 mg


Ketorolac Tromethamine (Toradol)  15 mg IVP Q6H PRN; Protocol


   PRN Reason: Pain, moderate (4-7)


   Last Admin: 04/15/19 05:44 Dose:  15 mg


Levothyroxine Sodium (Synthroid)  25 mcg PO THU [facility]; Protocol


   Last Admin: 04/18/19 10:54 Dose:  Not Given


Levothyroxine Sodium (Synthroid)  25 mcg PO TUE [facility]; Protocol


Losartan Potassium (Cozaar)  25 mg PO DAILY [facility]


   Last Admin: 04/18/19 10:55 Dose:  25 mg


Metoclopramide HCl (Reglan)  5 mg PO 0600,1130,1630,2200 [facility]


   Last Admin: 04/18/19 05:30 Dose:  Not Given


Ondansetron HCl (Zofran Inj)  4 mg IVP Q6H PRN; Protocol


   PRN Reason: Nausea/Vomiting


   Last Admin: 04/15/19 05:43 Dose:  4 mg


Pantoprazole Sodium (Protonix Ec Tab)  40 mg PO 0600 [facility]; Protocol


   Last Admin: 04/18/19 05:29 Dose:  40 mg


Scopolamine (Transderm-Scop)  1 patch TD Q3D [facility]; Protocol


   Last Admin: 04/16/19 15:41 Dose:  Not Given











- Labs


Labs: 


                                        





                                 04/16/19 07:00 





                                 04/16/19 07:00 











- Constitutional


Appears: Non-toxic, No Acute Distress





- Head Exam


Head Exam: ATRAUMATIC, NORMAL INSPECTION





- Respiratory Exam


Respiratory Exam: NORMAL BREATHING PATTERN.  absent: Respiratory Distress





- Cardiovascular Exam


Cardiovascular Exam: +S1, +S2





- GI/Abdominal Exam


GI & Abdominal Exam: Soft.  absent: Distended, Firm, Guarding, Rigid, 

Tenderness, Rebound


Additional comments: 


Dressing at inferior portion of incision over previous On-Q site changed. Small 

amount of dried drainage.


Staples in place


Colostomy with liquid stool and gas





- Neurological Exam


Neurological Exam: Alert, Awake, Oriented x3





- Psychiatric Exam


Psychiatric exam: Normal Affect, Normal Mood





- Skin


Skin Exam: Dry, Normal Color, Warm





Assessment and Plan





- Assessment and Plan (Free Text)


Assessment: 


74yo F with ventral hernia, parastomal hernia, and pelvic mass (path = 

adenocarcinoma) now s/p Exploratory laparotomy, primary ventral hernia repair x 

2, primary parastomal hernia repair, small bowel resection with side to side 

anastomosis, RIJ portacath insertion POD#9





- Tolerating regular diet & having bowel function into colostomy bag


- Continue PT


- Lovenox for DVT PPx


- Strongly encouraged IS use and OOB, ambulation


- Discussed plan with Dr. Jorge Fairchild PGY-4

## 2019-04-18 NOTE — PN
DATE:  04/18/2019



SUBJECTIVE:  She is comfortable in chair in no acute distress.  Complaining

of soreness in the abdomen.  She is able to tolerate soft diet.  She is

ambulating.  No nausea, no vomiting.



REVIEW OF SYSTEMS:  As per HPI.  Rest of 12-point review of systems

reviewed and negative.



PHYSICAL EXAMINATION:

GENERAL:  Text.

VITAL SIGNS:  Heart rate is 78 per minute, blood pressure 178/79, oxygen

saturation 95% room air, respiratory rate 18 per minute.

HEENT:  Pallor positive.

NECK:  No lymphadenopathy.

CHEST:  Air entry present and equal bilateral.  No added sounds.

CARDIOVASCULAR:  S1 and S2 normal.  No murmur.  No gallop.

ABDOMEN:  Slightly distended.  Nontender.  Staples present midline.

EXTREMITIES:  No edema.

CENTRAL NERVOUS SYSTEM:  Alert and oriented x3.  No focal sensory motor

deficit.



LABORATORY DATA:  White count 7.9, hemoglobin 10, hematocrit 32.7, platelet

307.  Sodium 137, potassium 4.3, creatinine 0.3.



MEDICATIONS:  Reviewed.



ASSESSMENT:

1.  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

2.  Anemia.

3.  Hypothyroidism.

4.  Mild abdominal discomfort.



PLAN:  Pain is fairly controlled with Toradol.  She is ambulating now. 

Tolerating oral.  Continue on DVT prophylaxis with Lovenox subcu daily,

Xanax p.r.n.  Synthroid is once a week.  Labs showed mild anemia,

hemoglobin of 10.  We will reevaluate once discharged from the hospital. 

Discussed the pathology report with her which was unremarkable for

malignancy.  Biopsy report was positive for rectal adenocarcinoma. 

Discussed with the staff nurse, advised to remove the port needle.













Thank you, Dr. Patel, for allowing us to participate in Ms. Hurley's care.







__________________________________________

Gaye Guerra MD





DD:  04/18/2019 9:25:04

DT:  04/18/2019 9:27:35

Job # [id number]

## 2019-04-19 LAB
ALBUMIN SERPL-MCNC: 3.3 G/DL (ref 3–4.8)
ALBUMIN/GLOB SERPL: 1.2 {RATIO} (ref 1.1–1.8)
ALT SERPL-CCNC: 38 U/L (ref 7–56)
AST SERPL-CCNC: 26 U/L (ref 14–36)
BASOPHILS # BLD AUTO: 0.01 K/MM3 (ref 0–2)
BASOPHILS NFR BLD: 0.1 % (ref 0–3)
BUN SERPL-MCNC: 10 MG/DL (ref 7–21)
CALCIUM SERPL-MCNC: 8.2 MG/DL (ref 8.4–10.5)
EOSINOPHIL # BLD: 0.2 10*3/UL (ref 0–0.7)
EOSINOPHIL NFR BLD: 2.9 % (ref 1.5–5)
ERYTHROCYTE [DISTWIDTH] IN BLOOD BY AUTOMATED COUNT: 13.7 % (ref 11.5–14.5)
GFR NON-AFRICAN AMERICAN: > 60
HGB BLD-MCNC: 10.3 G/DL (ref 12–16)
LYMPHOCYTES # BLD: 0.7 10*3/UL (ref 1.2–3.4)
LYMPHOCYTES NFR BLD AUTO: 10.2 % (ref 22–35)
MCH RBC QN AUTO: 28.9 PG (ref 25–35)
MCHC RBC AUTO-ENTMCNC: 31.7 G/DL (ref 31–37)
MCV RBC AUTO: 91.3 FL (ref 80–105)
MONOCYTES # BLD AUTO: 0.6 10*3/UL (ref 0.1–0.6)
MONOCYTES NFR BLD: 8 % (ref 1–6)
PLATELET # BLD: 343 10^3/UL (ref 120–450)
PMV BLD AUTO: 9.3 FL (ref 7–11)
RBC # BLD AUTO: 3.56 10^6/UL (ref 3.5–6.1)
WBC # BLD AUTO: 7 10^3/UL (ref 4.5–11)

## 2019-04-19 RX ADMIN — ENOXAPARIN SODIUM SCH MG: 40 INJECTION SUBCUTANEOUS at 06:30

## 2019-04-19 RX ADMIN — PANTOPRAZOLE SODIUM SCH MG: 40 TABLET, DELAYED RELEASE ORAL at 06:30

## 2019-04-19 NOTE — CP.PCM.PCO
Physician Communication Note





- Physician Communication Note


Physician Communication Note: Doing well/concur with d/c 4/20

## 2019-04-19 NOTE — CP.PCM.PN
Subjective





- Date & Time of Evaluation


Date of Evaluation: 04/19/19


Time of Evaluation: 07:31





- Subjective


Subjective: 





Surgery Progress Note for Dr. Patel





73F seen and evaluated at bedside this morning. No acute events overnight. No 

complaints this morning. Ostomy pink, patent, productive. Patient ambulating 

without difficulty. Denies f/c, n/v/d, SOB, CP, or urinary symptoms. 





Objective





- Vital Signs/Intake and Output


Vital Signs (last 24 hours): 


                                        











Temp Pulse Resp BP Pulse Ox


 


 97.9 F   77   18   134/69   95 


 


 04/17/19 10:00  04/18/19 17:27  04/17/19 10:00  04/18/19 17:27  04/18/19 13:21








Intake and Output: 


                                        











 04/19/19 04/19/19





 06:59 18:59


 


Intake Total 360 


 


Balance 360 














- Medications


Medications: 


                               Current Medications





Acetaminophen (Tylenol 325mg Tab)  650 mg PO Q6H PRN; Protocol


   PRN Reason: Pain, Mild (1-3)


Alprazolam (Xanax)  0.25 mg PO Q12H PRN; Protocol


   PRN Reason: Anxiety


   Stop: 04/21/19 08:47


Atenolol (Tenormin)  25 mg PO BID [facility]


   Last Admin: 04/18/19 17:27 Dose:  25 mg


Enoxaparin Sodium (Lovenox)  40 mg SC 0600 [facility]; Protocol


   Last Admin: 04/19/19 06:30 Dose:  40 mg


Hydralazine HCl (Apresoline)  10 mg IVP Q6 PRN; Protocol


   PRN Reason: Systolic Blood Pressure


   Last Admin: 04/17/19 05:40 Dose:  10 mg


Ketorolac Tromethamine (Toradol)  15 mg IVP Q6H PRN


   PRN Reason: Pain, moderate (4-7)


Levothyroxine Sodium (Synthroid)  25 mcg PO THU [facility]; Protocol


   Last Admin: 04/18/19 10:54 Dose:  Not Given


Levothyroxine Sodium (Synthroid)  25 mcg PO TUE [facility]; Protocol


Losartan Potassium (Cozaar)  25 mg PO DAILY [facility]


Metoclopramide HCl (Reglan)  5 mg PO 0600,1130,1630,2200 [facility]


   Last Admin: 04/19/19 06:31 Dose:  Not Given


Ondansetron HCl (Zofran Inj)  4 mg IVP Q6H PRN; Protocol


   PRN Reason: Nausea/Vomiting


   Last Admin: 04/15/19 05:43 Dose:  4 mg


Pantoprazole Sodium (Protonix Ec Tab)  40 mg PO 0600 [facility]; Protocol


   Last Admin: 04/19/19 06:30 Dose:  40 mg


Scopolamine (Transderm-Scop)  1 patch TD Q3D [facility]; Protocol


   Last Admin: 04/16/19 15:41 Dose:  Not Given











- Labs


Labs: 


                                        





                                 04/19/19 06:00 





                                 04/16/19 07:00 











- Constitutional


Appears: Non-toxic, No Acute Distress





- Head Exam


Head Exam: ATRAUMATIC, NORMAL INSPECTION, NORMOCEPHALIC





- Eye Exam


Eye Exam: EOMI





- ENT Exam


ENT Exam: Mucous Membranes Moist





- Respiratory Exam


Respiratory Exam: NORMAL BREATHING PATTERN.  absent: Wheezes, Respiratory 

Distress





- Cardiovascular Exam


Cardiovascular Exam: REGULAR RHYTHM





- GI/Abdominal Exam


GI & Abdominal Exam: Soft, Normal Bowel Sounds.  absent: Tenderness


Additional comments: 





ostomy pink, patent, productive


midline abd incision c/d/i w/ staples 


lower quadrant dressing changed 





- Extremities Exam


Extremities Exam: Normal Inspection





- Neurological Exam


Neurological Exam: Alert, Awake, Oriented x3





- Psychiatric Exam


Psychiatric exam: Normal Affect, Normal Mood





- Skin


Skin Exam: Dry, Intact, Normal Color, Warm





Assessment and Plan





- Assessment and Plan (Free Text)


Assessment: 





73F w/ rectal carcinoma recurrence s/p exploratory laparotomy w/ primary ventral

hernia repair, primary parastomal hernia repair, small bowel resection and RIJ 

port-a-cath insertion POD10


Plan: 





Patient in TCU 2/2 deconditioning 


Continue aggressive PT 


Encourage ambulation and OOBTC 


Midline abdominal dressing change daily 


Will remove 1/2 staples tomorrow prior to discharge 


DC planning 


D/w Dr. Jorge Davies PGY1

## 2019-04-20 VITALS — DIASTOLIC BLOOD PRESSURE: 82 MMHG | SYSTOLIC BLOOD PRESSURE: 130 MMHG | HEART RATE: 101 BPM

## 2019-04-20 LAB
BUN SERPL-MCNC: 13 MG/DL (ref 7–21)
CALCIUM SERPL-MCNC: 8.5 MG/DL (ref 8.4–10.5)
GFR NON-AFRICAN AMERICAN: > 60

## 2019-04-20 RX ADMIN — PANTOPRAZOLE SODIUM SCH: 40 TABLET, DELAYED RELEASE ORAL at 05:32

## 2019-04-20 RX ADMIN — ENOXAPARIN SODIUM SCH: 40 INJECTION SUBCUTANEOUS at 05:32

## 2019-04-20 NOTE — CP.PCM.DIS
Provider





- Provider


Date of Admission: 


04/13/19 13:31





Attending physician: 


Otoniel Patel MD





Primary care physician: 


Otoniel Patel MD





Consults: 








04/13/19 15:31


Nursing Referral for Wound Care Routine 


   Comment: POST EXPLORATORY LAP, REPAIR OF 3 VENTRAL HERNIA,


   Physician Instructions: 


   Reason For Exam: EVALUATION-BOWEL RESECTION WITH COLOSTOMY


Social Work Referral Routine 


   Comment: NEEDS ASSISTANCE AT HOME,VISITING NURSE,COLOSTOMY


   Physician Instructions: 


   Reason For Exam: EVALUATION-PT LIVES ALONE. HAD MAJOR SURGERY.





04/13/19 15:50


Nursing Referral for Wound Care Routine 


   Comment: COLOSTOMY CARE,MID ABDOMINAL STAPLES.X2 SMALL INCI


   Physician Instructions: 


   Reason For Exam: EVALUATION





04/16/19 19:55


Physician Consult Routine 


   Comment: 


   Consulting Provider: Gaye Guerra


   Consulting Physician: Gaye Guerra


   Reason for Consult: Rec rectal CA











Time Spent in preparation of Discharge (in minutes): 60





Hospital Course





- Lab Results


Lab Results: 


                             Most Recent Lab Values











WBC  7.0 10^3/uL (4.5-11.0)   04/19/19  06:00    


 


RBC  3.56 10^6/uL (3.5-6.1)   04/19/19  06:00    


 


Hgb  10.3 g/dL (12.0-16.0)  L  04/19/19  06:00    


 


Hct  32.5 % (36.0-48.0)  L  04/19/19  06:00    


 


MCV  91.3 fl (80.0-105.0)   04/19/19  06:00    


 


MCH  28.9 pg (25.0-35.0)   04/19/19  06:00    


 


MCHC  31.7 g/dl (31.0-37.0)   04/19/19  06:00    


 


RDW  13.7 % (11.5-14.5)   04/19/19  06:00    


 


Plt Count  343 10^3/uL (120.0-450.0)   04/19/19  06:00    


 


MPV  9.3 fl (7.0-11.0)   04/19/19  06:00    


 


Neut % (Auto)  78.8 % (50.0-68.0)  H  04/19/19  06:00    


 


Lymph % (Auto)  10.2 % (22.0-35.0)  L  04/19/19  06:00    


 


Mono % (Auto)  8.0 % (1.0-6.0)  H  04/19/19  06:00    


 


Eos % (Auto)  2.9 % (1.5-5.0)   04/19/19  06:00    


 


Baso % (Auto)  0.1 % (0.0-3.0)   04/19/19  06:00    


 


Lymph # (Auto)  0.7  (1.2-3.4)  L  04/19/19  06:00    


 


Mono # (Auto)  0.6  (0.1-0.6)   04/19/19  06:00    


 


Eos # (Auto)  0.2  (0.0-0.7)   04/19/19  06:00    


 


Baso # (Auto)  0.01 K/mm3 (0.0-2.0)   04/19/19  06:00    


 


Absolute Neuts (auto)  5.50  (1.4-6.5)   04/19/19  06:00    


 


Sodium  140 mmol/L (132-148)   04/19/19  06:00    


 


Potassium  3.3 mmol/L (3.6-5.0)  L  04/19/19  06:00    


 


Chloride  102 mmol/L ()   04/19/19  06:00    


 


Carbon Dioxide  31 mmol/L (21-33)   04/19/19  06:00    


 


Anion Gap  11  (10-20)   04/19/19  06:00    


 


BUN  10 mg/dL (7-21)   04/19/19  06:00    


 


Creatinine  0.6 mg/dl (0.7-1.2)  L  04/19/19  06:00    


 


Est GFR ( Amer)  > 60   04/19/19  06:00    


 


Est GFR (Non-Af Amer)  > 60   04/19/19  06:00    


 


Random Glucose  94 mg/dL ()   04/19/19  06:00    


 


Calcium  8.2 mg/dL (8.4-10.5)  L  04/19/19  06:00    


 


Phosphorus  3.8 mg/dL (2.5-4.5)   04/19/19  06:00    


 


Magnesium  1.9 mg/dL (1.7-2.2)   04/19/19  06:00    


 


Total Bilirubin  0.7 mg/dL (0.2-1.3)   04/19/19  06:00    


 


AST  26 U/L (14-36)   04/19/19  06:00    


 


ALT  38 U/L (7-56)   04/19/19  06:00    


 


Alkaline Phosphatase  104 U/L ()   04/19/19  06:00    


 


Total Protein  6.0 g/dL (5.8-8.3)   04/19/19  06:00    


 


Albumin  3.3 g/dL (3.0-4.8)   04/19/19  06:00    


 


Globulin  2.7 gm/dL  04/19/19  06:00    


 


Albumin/Globulin Ratio  1.2  (1.1-1.8)   04/19/19  06:00    














- Hospital Course


Hospital Course: 





This is a 73 year old female, past medical history significant for rectal cancer

s/p APR w/ end colostomy and oral chemotherapy with recurrence. Patient arrived 

to same day surgery and underwent exploratory laparotomy, primary ventral hernia

repair x 2, primary parastomal hernia repair, small bowel resection with side to

side anastomosis, RIJ portacath insertion on 4/9/19 with Dr. Otoniel Patel. 

Patient tolerated procedure and was taken to PACU in stable condition. A Mary 

drain, OnQ, and NGT were placed and monitored. Patient had delayed return of 

bowel function and diet was advanced as tolerated. Patient mary drain and NGT 

removed on 4/12/19. OnQ removed as well once patient's pain was controlled. 

Patient was then discharged to TCU for further rehabilitation secondary to 

deconditioning in the post-operative period. Patient worked with physical 

therapy daily without difficulty. Patient tolerated diet, pain was controlled, 

and ambulated. Home medications were continued during admission. Appropriate DVT

and GI prophylaxis administered. On 4/20, half of the midline incision staples 

were removed. Patient discharged to home in stable condition. 





Patient instructed to follow up with Dr. Patel in office, call for 

appointment. At that time the remaining staples will be removed. Patient 

acknowledged and verbalized understanding of instructions provided in discharge 

packet. All questions and concerns have been addressed. Patient instructed to 

return to the nearest emergency department if symptoms acutely worsen. 





Above is a brief summary, for a detailed hospital course please refer to medical

records. 





Discharge Exam





- Head Exam


Head Exam: ATRAUMATIC, NORMAL INSPECTION, NORMOCEPHALIC





- Eye Exam


Eye Exam: EOMI, Normal appearance


Pupil Exam: PERRL





- ENT Exam


ENT Exam: Mucous Membranes Moist





- Respiratory Exam


Respiratory Exam: NORMAL BREATHING PATTERN.  absent: Wheezes, Respiratory 

Distress





- Cardiovascular Exam


Cardiovascular Exam: REGULAR RHYTHM, +S1, +S2





- GI/Abdominal Exam


GI & Abdominal Exam: Normal Bowel Sounds, Soft.  absent: Distended, Firm, 

Guarding, Rebound, Rigid, Tenderness


Additional comments: 





dressing in the lower abdominal quadrant c/d/i 


staples intact - every other staple removed 








- Extremities Exam


Extremities exam: normal inspection, pedal pulses present





- Back Exam


Back exam: absent: CVA tenderness (L), CVA tenderness (R)





- Neurological Exam


Neurological exam: Alert, Oriented x3





- Psychiatric Exam


Psychiatric exam: Normal Affect, Normal Mood





- Skin


Skin Exam: Dry, Intact, Normal Color, Warm





Discharge Plan





- Follow Up Plan


Condition: GOOD


Disposition: HOME/ ROUTINE


Instructions:  Rectal Cancer (DC), Abdominal Hernia Repair (DC), Small Bowel 

Resection (DC)


Additional Instructions: 


Please follow up with Dr. Patel in office in 1 week. Please call to make an 

appointment. The rest of your staples will be removed at that time. 





Please resume all home medications and regular diet as tolerated. 





Activity as tolerated. No heavy lifting greater than 15 lbs for the next 4 

weeks. 





Ok to shower. Avoid baths, pools, or other large bodies of water for next 2 

weeks. 





Lower midline abdominal dressing changed as needed. Dress with 4x4 gauze and 

paper tape. 





If symptoms worsen, promptly return to the nearest emergency department. 


Referrals: 


Otoniel Patel MD [Primary Care Provider] -

## 2021-06-08 NOTE — CP.PCM.PN
Subjective





- Date & Time of Evaluation


Date of Evaluation: 03/30/19


Time of Evaluation: 02:22





- Subjective


Subjective: 





S: Patient has no acute symptoms.


    Nurse requested to hold discharge order.


    It is too late (10:45PM) and there is nobody at home to monitor patient.


    Had low pulse ox 90-92 went up to 98% after nasal canula 2l/ min.





O: VSS.


    Not in distress.


    LUNGS: normal breathing pattern.





A:Hypoxia.








P:Placed on oxygen.


   Monitor FiO2.


  Will enter order to hold dicharge until AM.








Objective





- Vital Signs/Intake and Output


Vital Signs (last 24 hours): 


                                        











Temp Pulse Resp BP Pulse Ox


 


 97.4 F L  69   20   147/81   98 


 


 03/29/19 21:40  03/29/19 21:40  03/29/19 21:40  03/29/19 21:40  03/29/19 22:00








Intake and Output: 


                                        











 03/29/19 03/30/19





 18:59 06:59


 


Intake Total  100


 


Balance  100














- Medications


Medications: 


                               Current Medications





Acetaminophen (Tylenol 325mg Tab)  650 mg PO Q4 PRN


   PRN Reason: Pain, Mild (1-3)


Sodium Chloride (Sodium Chloride 0.45%)  1,000 mls @ 80 mls/hr IV .[id number] BETTE


Ondansetron HCl (Zofran Inj)  4 mg IVP Q6H PRN


   PRN Reason: Nausea/Vomiting


Oxycodone/Acetaminophen (Percocet 5/325 Mg Tab)  1 tab PO Q4H PRN


   PRN Reason: Pain, moderate (4-7)


   Stop: 04/01/19 19:45











- Labs


Labs: 


                                        





                                 03/29/19 13:45 





                                 03/29/19 13:45 





                                        











PT  12.5 SECONDS (9.4-12.5)   03/29/19  13:45    


 


INR  1.11   03/29/19  13:45    


 


APTT  33.2 Seconds (26.9-38.3)   03/29/19  13:45
10+ years ago ok